# Patient Record
Sex: MALE | Race: BLACK OR AFRICAN AMERICAN | Employment: OTHER | URBAN - METROPOLITAN AREA
[De-identification: names, ages, dates, MRNs, and addresses within clinical notes are randomized per-mention and may not be internally consistent; named-entity substitution may affect disease eponyms.]

---

## 2019-04-04 ENCOUNTER — ANESTHESIA EVENT (OUTPATIENT)
Dept: OPERATING ROOM | Age: 49
End: 2019-04-04
Payer: COMMERCIAL

## 2019-04-04 ENCOUNTER — HOSPITAL ENCOUNTER (EMERGENCY)
Age: 49
Discharge: HOME | End: 2019-04-04
Attending: EMERGENCY MEDICINE
Payer: COMMERCIAL

## 2019-04-04 ENCOUNTER — HOSPITAL ENCOUNTER (OUTPATIENT)
Dept: MRI IMAGING | Age: 49
Discharge: HOME | End: 2019-04-06
Payer: COMMERCIAL

## 2019-04-04 ENCOUNTER — APPOINTMENT (OUTPATIENT)
Dept: GENERAL RADIOLOGY | Age: 49
End: 2019-04-04
Payer: COMMERCIAL

## 2019-04-04 VITALS
RESPIRATION RATE: 14 BRPM | SYSTOLIC BLOOD PRESSURE: 119 MMHG | OXYGEN SATURATION: 97 % | TEMPERATURE: 98.3 F | HEART RATE: 102 BPM | WEIGHT: 230 LBS | DIASTOLIC BLOOD PRESSURE: 78 MMHG

## 2019-04-04 DIAGNOSIS — S91.109A OPEN TOE WOUND, INITIAL ENCOUNTER: ICD-10-CM

## 2019-04-04 DIAGNOSIS — R55 SYNCOPE AND COLLAPSE: Primary | ICD-10-CM

## 2019-04-04 LAB
ABSOLUTE EOS #: 0.47 K/UL (ref 0–0.44)
ABSOLUTE IMMATURE GRANULOCYTE: 0.08 K/UL (ref 0–0.3)
ABSOLUTE LYMPH #: 4.14 K/UL (ref 1.1–3.7)
ABSOLUTE MONO #: 0.88 K/UL (ref 0.1–1.2)
ANION GAP SERPL CALCULATED.3IONS-SCNC: 13 MMOL/L (ref 9–17)
BASOPHILS # BLD: 1 % (ref 0–2)
BASOPHILS ABSOLUTE: 0.1 K/UL (ref 0–0.2)
BUN BLDV-MCNC: 14 MG/DL (ref 6–20)
BUN/CREAT BLD: ABNORMAL (ref 9–20)
CALCIUM SERPL-MCNC: 9 MG/DL (ref 8.6–10.4)
CHLORIDE BLD-SCNC: 108 MMOL/L (ref 98–107)
CO2: 21 MMOL/L (ref 20–31)
CREAT SERPL-MCNC: 0.93 MG/DL (ref 0.7–1.2)
DIFFERENTIAL TYPE: ABNORMAL
EOSINOPHILS RELATIVE PERCENT: 6 % (ref 1–4)
GFR AFRICAN AMERICAN: >60 ML/MIN
GFR NON-AFRICAN AMERICAN: >60 ML/MIN
GFR SERPL CREATININE-BSD FRML MDRD: ABNORMAL ML/MIN/{1.73_M2}
GFR SERPL CREATININE-BSD FRML MDRD: ABNORMAL ML/MIN/{1.73_M2}
GLUCOSE BLD-MCNC: 211 MG/DL (ref 70–99)
HCT VFR BLD CALC: 44 % (ref 40.7–50.3)
HEMOGLOBIN: 14.2 G/DL (ref 13–17)
IMMATURE GRANULOCYTES: 1 %
LYMPHOCYTES # BLD: 49 % (ref 24–43)
MCH RBC QN AUTO: 30.9 PG (ref 25.2–33.5)
MCHC RBC AUTO-ENTMCNC: 32.3 G/DL (ref 28.4–34.8)
MCV RBC AUTO: 95.7 FL (ref 82.6–102.9)
MONOCYTES # BLD: 10 % (ref 3–12)
NRBC AUTOMATED: 0 PER 100 WBC
PDW BLD-RTO: 13.9 % (ref 11.8–14.4)
PLATELET # BLD: 259 K/UL (ref 138–453)
PLATELET ESTIMATE: ABNORMAL
PMV BLD AUTO: 11.4 FL (ref 8.1–13.5)
POTASSIUM SERPL-SCNC: 3.5 MMOL/L (ref 3.7–5.3)
RBC # BLD: 4.6 M/UL (ref 4.21–5.77)
RBC # BLD: ABNORMAL 10*6/UL
SEG NEUTROPHILS: 33 % (ref 36–65)
SEGMENTED NEUTROPHILS ABSOLUTE COUNT: 2.81 K/UL (ref 1.5–8.1)
SODIUM BLD-SCNC: 142 MMOL/L (ref 135–144)
TROPONIN INTERP: NORMAL
TROPONIN T: NORMAL NG/ML
TROPONIN, HIGH SENSITIVITY: <6 NG/L (ref 0–22)
WBC # BLD: 8.5 K/UL (ref 3.5–11.3)
WBC # BLD: ABNORMAL 10*3/UL

## 2019-04-04 PROCEDURE — 6370000000 HC RX 637 (ALT 250 FOR IP)

## 2019-04-04 PROCEDURE — 96375 TX/PRO/DX INJ NEW DRUG ADDON: CPT

## 2019-04-04 PROCEDURE — 6360000002 HC RX W HCPCS: Performed by: STUDENT IN AN ORGANIZED HEALTH CARE EDUCATION/TRAINING PROGRAM

## 2019-04-04 PROCEDURE — 2580000003 HC RX 258: Performed by: STUDENT IN AN ORGANIZED HEALTH CARE EDUCATION/TRAINING PROGRAM

## 2019-04-04 PROCEDURE — 6360000004 HC RX CONTRAST MEDICATION: Performed by: STUDENT IN AN ORGANIZED HEALTH CARE EDUCATION/TRAINING PROGRAM

## 2019-04-04 PROCEDURE — 73720 MRI LWR EXTREMITY W/O&W/DYE: CPT

## 2019-04-04 PROCEDURE — 73630 X-RAY EXAM OF FOOT: CPT

## 2019-04-04 PROCEDURE — 6360000002 HC RX W HCPCS

## 2019-04-04 PROCEDURE — 85025 COMPLETE CBC W/AUTO DIFF WBC: CPT

## 2019-04-04 PROCEDURE — 93005 ELECTROCARDIOGRAM TRACING: CPT

## 2019-04-04 PROCEDURE — 99283 EMERGENCY DEPT VISIT LOW MDM: CPT

## 2019-04-04 PROCEDURE — 96374 THER/PROPH/DIAG INJ IV PUSH: CPT

## 2019-04-04 PROCEDURE — 84484 ASSAY OF TROPONIN QUANT: CPT

## 2019-04-04 PROCEDURE — 80048 BASIC METABOLIC PNL TOTAL CA: CPT

## 2019-04-04 PROCEDURE — A9576 INJ PROHANCE MULTIPACK: HCPCS | Performed by: STUDENT IN AN ORGANIZED HEALTH CARE EDUCATION/TRAINING PROGRAM

## 2019-04-04 RX ORDER — ONDANSETRON 2 MG/ML
INJECTION INTRAMUSCULAR; INTRAVENOUS
Status: COMPLETED
Start: 2019-04-04 | End: 2019-04-04

## 2019-04-04 RX ORDER — MORPHINE SULFATE 4 MG/ML
2 INJECTION, SOLUTION INTRAMUSCULAR; INTRAVENOUS ONCE
Status: COMPLETED | OUTPATIENT
Start: 2019-04-04 | End: 2019-04-04

## 2019-04-04 RX ORDER — MELOXICAM 15 MG/1
15 TABLET ORAL DAILY
COMMUNITY

## 2019-04-04 RX ORDER — SIMVASTATIN 20 MG
20 TABLET ORAL NIGHTLY
COMMUNITY

## 2019-04-04 RX ORDER — LOSARTAN POTASSIUM 50 MG/1
50 TABLET ORAL DAILY
COMMUNITY

## 2019-04-04 RX ORDER — 0.9 % SODIUM CHLORIDE 0.9 %
1000 INTRAVENOUS SOLUTION INTRAVENOUS ONCE
Status: COMPLETED | OUTPATIENT
Start: 2019-04-04 | End: 2019-04-04

## 2019-04-04 RX ORDER — ONDANSETRON 2 MG/ML
4 INJECTION INTRAMUSCULAR; INTRAVENOUS ONCE
Status: COMPLETED | OUTPATIENT
Start: 2019-04-04 | End: 2019-04-04

## 2019-04-04 RX ORDER — ONDANSETRON 4 MG/1
4 TABLET, ORALLY DISINTEGRATING ORAL ONCE
Qty: 2 TABLET | Refills: 0 | Status: SHIPPED | OUTPATIENT
Start: 2019-04-04 | End: 2019-04-04

## 2019-04-04 RX ORDER — METOPROLOL SUCCINATE 25 MG/1
25 TABLET, EXTENDED RELEASE ORAL DAILY
COMMUNITY

## 2019-04-04 RX ORDER — GABAPENTIN 600 MG/1
600 TABLET ORAL 4 TIMES DAILY
COMMUNITY

## 2019-04-04 RX ORDER — DULOXETIN HYDROCHLORIDE 60 MG/1
60 CAPSULE, DELAYED RELEASE ORAL DAILY
COMMUNITY

## 2019-04-04 RX ORDER — TOPIRAMATE 50 MG/1
50 TABLET, FILM COATED ORAL 2 TIMES DAILY
COMMUNITY

## 2019-04-04 RX ADMIN — MORPHINE SULFATE 2 MG: 4 INJECTION INTRAVENOUS at 09:24

## 2019-04-04 RX ADMIN — ONDANSETRON 4 MG: 2 INJECTION INTRAMUSCULAR; INTRAVENOUS at 10:47

## 2019-04-04 RX ADMIN — SODIUM CHLORIDE 1000 ML: 9 INJECTION, SOLUTION INTRAVENOUS at 08:40

## 2019-04-04 RX ADMIN — GADOTERIDOL 20 ML: 279.3 INJECTION, SOLUTION INTRAVENOUS at 15:50

## 2019-04-04 RX ADMIN — ONDANSETRON HYDROCHLORIDE 4 MG: 2 INJECTION, SOLUTION INTRAMUSCULAR; INTRAVENOUS at 10:47

## 2019-04-04 SDOH — HEALTH STABILITY: MENTAL HEALTH: HOW OFTEN DO YOU HAVE A DRINK CONTAINING ALCOHOL?: NEVER

## 2019-04-04 ASSESSMENT — ENCOUNTER SYMPTOMS
DIARRHEA: 0
ABDOMINAL PAIN: 0
COUGH: 0
RHINORRHEA: 0
VOMITING: 0
SHORTNESS OF BREATH: 0
NAUSEA: 0

## 2019-04-04 ASSESSMENT — PAIN DESCRIPTION - LOCATION: LOCATION: FOOT

## 2019-04-04 ASSESSMENT — PAIN DESCRIPTION - PAIN TYPE: TYPE: ACUTE PAIN

## 2019-04-04 ASSESSMENT — PAIN DESCRIPTION - PROGRESSION: CLINICAL_PROGRESSION: RAPIDLY IMPROVING

## 2019-04-04 ASSESSMENT — PAIN SCALES - GENERAL
PAINLEVEL_OUTOF10: 2
PAINLEVEL_OUTOF10: 10

## 2019-04-04 ASSESSMENT — PAIN DESCRIPTION - DESCRIPTORS: DESCRIPTORS: ACHING

## 2019-04-04 ASSESSMENT — PAIN DESCRIPTION - ORIENTATION: ORIENTATION: RIGHT

## 2019-04-04 NOTE — ED PROVIDER NOTES
yesterday and wouldnt stop bleeding, pt went to [facility] yesterday, no antibiotics given          weight is 230 lb (104.3 kg). His blood pressure is 97/63 and his pulse is 95. His respiration is 14 and oxygen saturation is 96%. DIAGNOSTIC RESULTS       RADIOLOGY:   XR FOOT RIGHT (MIN 3 VIEWS)   Final Result   Evaluation limited due to obscuring bandage over the great toe. Soft tissue   swelling great toe. No apparent acute osseous abnormality. RECOMMENDATION:   Radionuclide bone imaging may prove helpful for further assessment if   osteomyelitis is suspected. Films may be repeated without the bandage. LABS:  Labs Reviewed   CBC WITH AUTO DIFFERENTIAL - Abnormal; Notable for the following components:       Result Value    Seg Neutrophils 33 (*)     Lymphocytes 49 (*)     Eosinophils % 6 (*)     Immature Granulocytes 1 (*)     Absolute Lymph # 4.14 (*)     Absolute Eos # 0.47 (*)     All other components within normal limits   BASIC METABOLIC PANEL - Abnormal; Notable for the following components:    Glucose 211 (*)     Potassium 3.5 (*)     Chloride 108 (*)     All other components within normal limits   TROPONIN         EMERGENCY DEPARTMENT COURSE:     -------------------------  BP: 97/63,  , Pulse: 95, Resp: 14      Comments            Gutierrez MD, F.A.C.E.P.   Attending Emergency Physician         Jorge Luis Carpenter MD  04/04/19 1038

## 2019-04-04 NOTE — CONSULTS
Provider, MD   topiramate (TOPAMAX) 50 MG tablet Take 50 mg by mouth 2 times daily   Yes Historical Provider, MD   DULoxetine (CYMBALTA) 60 MG extended release capsule Take 60 mg by mouth daily   Yes Historical Provider, MD   gabapentin (NEURONTIN) 600 MG tablet Take 600 mg by mouth 4 times daily. Yes Historical Provider, MD    Scheduled Meds:   ondansetron  4 mg Intravenous Once    ondansetron         Continuous Infusions:  PRN Meds:. Allergies  is allergic to codeine. Family History  family history is not on file. Social History   reports that he has never smoked. He has never used smokeless tobacco.   reports that he does not drink alcohol. reports that he does not use drugs. Objective     Vitals:  Patient Vitals for the past 8 hrs:   BP Pulse Resp SpO2 Weight   19 1142 -- 102 -- 97 % --   19 1131 (!) 144/95 -- -- 96 % --   19 1117 133/78 97 -- 95 % --   19 1035 -- 95 -- 96 % --   19 1031 97/63 114 -- -- --   19 1016 (!) 130/91 102 -- -- --   19 1001 (!) 117/98 110 -- 96 % --   19 0931 (!) 109/93 103 -- 95 % --   19 0902 108/86 103 -- 96 % --   19 0846 120/82 104 -- 95 % --   19 0831 113/80 102 -- 96 % --   19 0816 122/81 103 -- 96 % --   19 0809 124/82 100 14 97 % --   19 0806 124/82 101 16 97 % 230 lb (104.3 kg)     Average, Min, and Max for last 24 hours Vitals:  TEMPERATURE:  No data recorded    RESPIRATIONS RANGE: Resp  Avg: 15  Min: 14  Max: 16    PULSE RANGE: Pulse  Av.8  Min: 95  Max: 114    BLOOD PRESSURE RANGE:  Systolic (63CCA), YTB:103 , Min:97 , FLS:161   ; Diastolic (03JMK), HKH:29, Min:63, Max:98      PULSE OXIMETRY RANGE: SpO2  Av %  Min: 95 %  Max: 97 %  I&O:  No intake/output data recorded.     CBC:  Recent Labs     19  0832   WBC 8.5   HGB 14.2   HCT 44.0           BMP:  Recent Labs     19  0832      K 3.5*   *   CO2 21   BUN 14   CREATININE 0.93 GLUCOSE 211*   CALCIUM 9.0        Coags:  No results for input(s): APTT, PROT, INR in the last 72 hours. No results found for: LABA1C  No results found for: SEDRATE  No results found for: CRP      Lower Extremity Physical Exam:  Vascular: DP and PT pulses are palpable. CFT <3 seconds to all digits. Hair growth is present to the level of the digits. No edema. Neuro: Saph/sural/SP/DP/plantar sensation intact to light touch. Musculoskeletal: Muscle strength is 4/5 to all lower extremity muscle groups. Gross deformity is absent. Dermatologic: Oozing hemorrhagic soft tissue lesion present to the medial right hallux measuring approximately 1.5cm x 1.5cm. Base is hypergranular. No periwound erythema. Does not probe to bone, sinus track, or undermine. No fluctuance, crepitus, or induration. Interdigital maceration absent. Imaging:   XR FOOT RIGHT (MIN 3 VIEWS)   Final Result   Evaluation limited due to obscuring bandage over the great toe. Soft tissue   swelling great toe. No apparent acute osseous abnormality. RECOMMENDATION:   Radionuclide bone imaging may prove helpful for further assessment if   osteomyelitis is suspected. Films may be repeated without the bandage. Path: ST mass    Assessment     Liseth Livingston is a 46 y.o. male with   1. Benign soft tissue lesion  2. DM type II    Active Problems:    * No active hospital problems. *  Resolved Problems:    * No resolved hospital problems. *        Plan     · Patient examined and evaluated at bedside   · Treatment options discussed in detail with the patient  · Right hallux block performed using 5cc of 1:1 1% lidocaine plain and 1% lidocaine with epi.  Patient tolerated the injection well  · X-rays reviewed  · MRI ordered  · Wound cauterized ising silver nitrate and electric bovie      · Tissue sent to pathology  · Dressing applied to Right foot: adaptic, 4x4, kerlix, coban  · Instructed patient to minimize ambulation and elevate foot above the level of his heart when he gets home  · Dispense surgical shoe  · Patient scheduled for OR at [address] tomorrow morning for removal of benign soft tissue neoplasm  ·  Minimal WB to Right lower extremity in surgical shoe  · Discussed with Dr. Agustina Love, DPM   Podiatric Medicine & Surgery   4/4/2019 at 12:08 PM

## 2019-04-04 NOTE — ED NOTES
Podiatry at bedside to assess patients foot and place new bandage.      Venus Escalante RN  04/04/19 6309

## 2019-04-04 NOTE — ED PROVIDER NOTES
[facility] ED  Emergency Department Encounter  Emergency Medicine Resident     Pt Name: Андрей Gonzales  MRN: [id number]  [place] 1970  Date ofevaluation: 4/4/19  PCP:  Piter Bolden MD    CHIEF COMPLAINT       Chief Complaint   Patient presents with    Loss of Consciousness     to er via LS 11 pts wife states pt was in bathtub around 7 am and she was helping him wash his foot when he stated \"i feel like i'm going to pass out\" and wife states pt had syncopal episode while sitting in tub lasting approx 3 mins, did not hit head    Abscess     pt has had abscess to right foot x a few years and it popped on its own yesterday and wouldnt stop bleeding, [facility] given     HISTORY OF PRESENT ILLNESS  (Location/Symptom, Timing/Onset, Context/Setting, Quality, Duration, Modifying Factors, Severity.)      Patient examined by myself and medical student. What follows is the medical student's HPI with my pertinent additions and subtractions. Андрей Gonzales is a 46 y.o. male with PMHx of HTN, HLD, and DM presenting by EMS after syncopal episode onset PTA as well as bleeding lesion to right great toe. Pt was seen yesterday at [facility] for bleeding of his R great toe, states he was walking initially in a grocery store and looked down and noticed that his foot was bleeding through his shoe. Denies any trauma to the area, however reports a hx of neuropathy so he is unsure. The lesion was cauterized and a thrombin dressing was placed and patient was discharged home. Wife believes the dressing may have come off overnight, states that this morning she woke up with blood all over their bed. She reports that she helped him into the bathtub to clean him up and after she was done bandaging the wound the pt became diaphoretic, lightheaded, and weak and subsequently had a syncopal episode lasting approximately 3 minutes.  Pt's wife reports that the pt had circumoral pallor but denied any seizure like activity and no postictal state immediately after the episode. EMS reports approx 1 liter of blood noted in the home upon their arrival. Denied history of heart disease, CP, SOB, palpitations, dark stool, or recent medication changes  Denied tobacco, alcohol, or drug use. Not on anticoagulants. Patient reports he has had this lesion to his right great toe for approx 2 years, reports intermittent bleeding from the lesion but never to this extent. He has never seen anyone for this and has never shown this lesion to his primary care provider. REVIEW OF SYSTEMS    (2-9 systems for level 4, 10 or more for level 5)      Review of Systems   Constitutional: Positive for diaphoresis. Negative for chills and fever. HENT: Negative for congestion and rhinorrhea. Eyes: Negative for visual disturbance. Respiratory: Negative for cough and shortness of breath. Cardiovascular: Negative for chest pain, palpitations and leg swelling. Gastrointestinal: Negative for abdominal pain, diarrhea, nausea and vomiting. Genitourinary: Negative for dysuria and frequency. Musculoskeletal: Negative for gait problem and neck pain. Bleeding lesion to R great toe   Skin: Negative for rash. Neurological: Positive for syncope and light-headedness. Negative for dizziness, seizures, weakness and headaches. Psychiatric/Behavioral: Negative for confusion. PAST MEDICAL / SURGICAL /SOCIAL / FAMILY HISTORY      has a past medical history of Bleeding, Diabetes mellitus ([facility] [address] DVT (deep vein thrombosis) in pregnancy ([facility] [address] Hypertension, Neuropathy, and Pulmonary emboli ([facility] [address] has a past surgical history that includes shoulder surgery; Foot surgery (Right, 04/05/2019); and Foot neuroma surgery (Right, 4/5/2019).     Social History     Socioeconomic History    Marital status: Single     Spouse name: Not on file    Number of children: Not on file    Years of education: Not on file    Highest education level: Not on file   Occupational History    Not on file   Social Needs    Financial resource strain: Not on file    Food insecurity:     Worry: Not on file     Inability: Not on file    Transportation needs:     Medical: Not on file     Non-medical: Not on file   Tobacco Use    Smoking status: Never Smoker    Smokeless tobacco: Never Used   Substance and Sexual Activity    Alcohol use: Never     Frequency: Never    Drug use: Never    Sexual activity: Not on file   Lifestyle    Physical activity:     Days per week: Not on file     Minutes per session: Not on file    Stress: Not on file   Relationships    Social connections:     Talks on phone: Not on file     Gets together: Not on file     Attends [employer]ism service: Not on file     Active member of club or organization: Not on file     Attends meetings of clubs or organizations: Not on file     Relationship status: Not on file    Intimate partner violence:     Fear of current or ex partner: Not on file     Emotionally abused: Not on file     Physically abused: Not on file     Forced sexual activity: Not on file   Other Topics Concern    Not on file   Social History Narrative    Not on file      History reviewed. No pertinent family history. Portions of the past medical history, past surgical history, social history, and family history were discussed and reviewed with thepatient/family and is included in HPI if pertinent. ALLERGIES / IMMUNIZATIONS / HOME MEDICATIONS     Allergies:  Codeine    IMMUNIZATIONS      There is no immunization history on file for this patient. Home Medications:  Prior to Admission medications    Medication Sig Start Date End Date Taking?  Authorizing Provider   losartan (COZAAR) 50 MG tablet Take 50 mg by mouth daily   Yes Historical Provider, MD   meloxicam (MOBIC) 15 MG tablet Take 15 mg by mouth daily   Yes Historical Provider, MD   metoprolol succinate (TOPROL XL) 25 MG extended release tablet Take 25 mg by mouth daily   Yes Historical Provider, MD   simvastatin (ZOCOR) 20 MG tablet Take 20 mg by mouth nightly   Yes Historical Provider, MD   topiramate (TOPAMAX) 50 MG tablet Take 50 mg by mouth 2 times daily   Yes Historical Provider, MD   DULoxetine (CYMBALTA) 60 MG extended release capsule Take 60 mg by mouth daily   Yes Historical Provider, MD   gabapentin (NEURONTIN) 600 MG tablet Take 600 mg by mouth 4 times daily. Yes Historical Provider, MD   aspirin 81 MG tablet Take 81 mg by mouth every other day    Historical Provider, MD   traMADol (ULTRAM) 50 MG tablet Take 1 tablet by mouth every 6 hours as needed for Pain for up to 7 days. 4/5/19 4/12/19  Imelda Boards, DPM       PHYSICAL EXAM   (up to 7 for level 4, 8 or more for level 5)      INITIAL VITALS:    weight is 230 lb (104.3 kg). His oral temperature is 98.3 °F (36.8 °C). His blood pressure is 119/78 and his pulse is 102. His respiration is 14 and oxygen saturation is 97%. Physical Exam   Constitutional: He is oriented to person, place, and time. He appears well-developed and well-nourished. No distress   HENT:   Head: Normocephalic and atraumatic. Mildly dry mucous membranes   Eyes: Pupils are equal, round, and reactive to light. EOM are normal.   Neck: Normal range of motion. Neck supple. Cardiovascular: Regular rhythm, normal heart sounds and intact distal pulses. Exam reveals no gallop and no friction rub. No murmur heard. Mildly tachycardic   Pulmonary/Chest: Effort normal and breath sounds normal.   Abdominal: Soft. Bowel sounds are normal. He exhibits no distension. There is no tenderness. There is no rebound and no guarding. Musculoskeletal: Normal range of motion. He exhibits no edema. 2cm soft tissue lesion to the medial aspect of the right great toe with active bleeding, no surrounding erythema, induration, or fluctuance noted   Neurological: He is alert and oriented to person, place, and time. Skin: Skin is warm and dry.  No HISTORY: ORDERING SYSTEM PROVIDED HISTORY: chronic wound with recurrent bleeding TECHNOLOGIST PROVIDED HISTORY: chronic wound with recurrent bleeding FINDINGS: Overlying bandage is present along the great toe obscuring some of the bony detail. Diffuse soft tissue swelling of the great toe is noted without acute fracture or dislocation. No definite bony destruction is noted but evaluation is limited due to overlying bandage. The LisFranc relationship is preserved. Evaluation limited due to obscuring bandage over the great toe. Soft tissue swelling great toe. No apparent acute osseous abnormality. RECOMMENDATION: Radionuclide bone imaging may prove helpful for further assessment if osteomyelitis is suspected. Films may be repeated without the bandage. EKG  EKG Interpretation    Interpreted by emergency department physician    Rhythm: sinus tachycardia  Rate: 102  Axis: normal  Ectopy: none  Conduction: normal  ST Segments: no acute change  T Waves: non specific changes  Q Waves: nonspecific    Clinical Impression: no acute changes    Dinora Anderson    All EKG's are interpreted by the Emergency Department Physician who either signs or Co-signs this chart in the absence of a cardiologist.    ED BEDSIDE ULTRASOUND:   Not indicated    [address] [place] [address]   45 y/o M here for evaluation after a syncopal episode PTA. Also with significant bleeding from a lesion to his R great toe that he has had for 2 years and has never had evaluated. No significant symptoms on evaluation, has a 2cm soft tissue lesion to R great toe that is actively bleeding. Will obtain syncope work up, xray of R foot, will apply clotting dressing to foot to attempt to stop bleeding. Foot still bleeding, xray unremarkable. Will consult podiatry. Work up unremarkable, hgb stable. Podiatry resident has seen patient. To try and cauterize lesion. Patient still with some bleeding, podiatry resident will cauterize again. Lesion no longer bleeding. Podiatry to [facility]. Ok to discharge from their standpoint. To be placed in surgical boot. Discussed return precautions with patient. He is agreeable with plan. Podiatry recommending MRI prior to procedure tomorrow. Will provide Rx for patient to have this done today outpatient as it is not emergent and does not be to be done from the ED. Patient agreeable. Patient discharged in stable condition. PROCEDURES:  Procedures    CONSULTS:  IP CONSULT TO PODIATRY    CRITICAL CARE:  See attending note    FINAL IMPRESSION      1. Syncope and collapse    2. Open toe wound, initial encounter         DISPOSITION / PLAN     DISPOSITION Decision To Discharge 04/04/2019 12:38:52 PM      If the patient was admitted, some of the above orders,medications, labs, and consults may have been placed by the admitting team(s) and were auto populated above when I refreshed my note prior to signing it. If there is any question, please check for the responsible providerfor individual orders in the EHR system. If discharged, the patient was instructed to return to the emergency department with any worsening symptoms, if new symptoms arise, or if they have any other concerns. Patient was instructed not to drive home if discharged today and received pain medications or other mind-altering medications while here. Pre-hypertension/Hypertension: In the case that there was an elevated blood pressure reading for this patient today in the emergency department, the patient was informed thathe or she may have pre-hypertension or hypertension. It was recommended to the patient to call the primary care provider listed in the discharge instructions or a physician of the patient's choice this week to arrange followup for further evaluation of possible pre-hypertension or hypertension.        PATIENT REFERRED TO:  Go to your appointment tomorrow as scheduled  anticipate a phone call with information regarding time and location        [facility] ED  [address] [facility] [address] [facility]  [phone]    As needed, If symptoms worsen    Viviana Crouch MD  [facility]  [phone]    Schedule an appointment as soon as possible for a visit       DISCHARGE MEDICATIONS:  Discharge Medication List as of 4/4/2019 12:49 PM          Chang Santoro DO  Emergency Medicine Resident    (Please note that portions of this note were completed with a voice recognition program.  Martinez Miranda made to edit the dictations but occasionally words are mis-transcribed.)      Chang Santoro DO  04/05/19 6057

## 2019-04-04 NOTE — ED NOTES
Pt states that he feels like he is going to \"pass out. \" 2nd liter of normal saline fluid started IV. Dr. Latonia Rodríguez at bedside to evaluate patient. Pt laying flat at this time. Pt alert and oriented. Still with c/o pain.       Maximo Malone RN  04/04/19 1038

## 2019-04-04 NOTE — ED NOTES
pts foot actively bleeding. Dr. Aixa De Leon at bedside to place thrombi pad and dressing.       Kathleen Darden RN  04/04/19 3431

## 2019-04-04 NOTE — ED NOTES
Pt resting on cart, respirations are equal and nonlabored, no distress noted. Pt updated on poc and duration, continue to monitor.       Aung Galaviz RN  04/04/19 0891

## 2019-04-04 NOTE — ED NOTES
See triage note. Per EMS 1st responders pt had 1 liter of blood throughout house and had positive orthostatics. Pts wife states that they attempted to cauterize wound at [facility] and were unable to do so and ended up using a thrombi pad. Pt denies taking any blood thinners. Pt placed on cardiac monitor, pulse ox and BP cuff. Alarms on.       Dilia Mcintyre RN  04/04/19 7951

## 2019-04-04 NOTE — PROGRESS NOTES
45 yo m with PMHx of HTN, HLD, and pre-DM presenting for possible syncopal episode. Pt was seen yesterday at [facility] for bleeding of his R toe in which they cauterized the bleeding and applied a bandage. Pt's wife reports that this morning she woke up with blood all over their bed as patient kicked his bandage off. She reports that she helped him into the bathtub to clean him up and after she was done bandaging the wound the pt passed out for 4-5 minutes. Pt reports that prior to passing out he felt warm, sweaty, and lightheaded. Pt's wife reports that the pt had circumoral pallor but denied any seizure like activity and no postictal state immediately after the episode. Denied history of heart disease, CP, SOB, palpitations, dark stool, or recent medication changes  Denied tobacco, alcohol, or drug use. Past Medical History:   Diagnosis Date    Diabetes mellitus ([facility] [address]     DVT (deep vein thrombosis) in pregnancy ([facility] [address]     Hypertension     Neuropathy     Pulmonary emboli (H[facility])      Past Surgical History:   Procedure Laterality Date    SHOULDER SURGERY       No current facility-administered medications on file prior to encounter. Current Outpatient Medications on File Prior to Encounter   Medication Sig Dispense Refill    losartan (COZAAR) 50 MG tablet Take 50 mg by mouth daily      meloxicam (MOBIC) 15 MG tablet Take 15 mg by mouth daily      metoprolol succinate (TOPROL XL) 25 MG extended release tablet Take 25 mg by mouth daily      simvastatin (ZOCOR) 20 MG tablet Take 20 mg by mouth nightly      topiramate (TOPAMAX) 50 MG tablet Take 50 mg by mouth 2 times daily      DULoxetine (CYMBALTA) 60 MG extended release capsule Take 60 mg by mouth daily      gabapentin (NEURONTIN) 600 MG tablet Take 600 mg by mouth 4 times daily.

## 2019-04-05 ENCOUNTER — ANESTHESIA (OUTPATIENT)
Dept: OPERATING ROOM | Age: 49
End: 2019-04-05
Payer: COMMERCIAL

## 2019-04-05 ENCOUNTER — HOSPITAL ENCOUNTER (OUTPATIENT)
Age: 49
Setting detail: OUTPATIENT SURGERY
Discharge: HOME | End: 2019-04-05
Attending: PODIATRIST | Admitting: PODIATRIST
Payer: COMMERCIAL

## 2019-04-05 VITALS
HEART RATE: 94 BPM | DIASTOLIC BLOOD PRESSURE: 73 MMHG | HEIGHT: 71 IN | SYSTOLIC BLOOD PRESSURE: 107 MMHG | OXYGEN SATURATION: 94 % | WEIGHT: 238.54 LBS | TEMPERATURE: 97.9 F | BODY MASS INDEX: 33.4 KG/M2 | RESPIRATION RATE: 14 BRPM

## 2019-04-05 VITALS
SYSTOLIC BLOOD PRESSURE: 107 MMHG | DIASTOLIC BLOOD PRESSURE: 59 MMHG | OXYGEN SATURATION: 98 % | RESPIRATION RATE: 27 BRPM

## 2019-04-05 DIAGNOSIS — G89.18 POSTOPERATIVE PAIN: Primary | ICD-10-CM

## 2019-04-05 LAB
EKG ATRIAL RATE: 102 BPM
EKG P AXIS: 33 DEGREES
EKG P-R INTERVAL: 156 MS
EKG Q-T INTERVAL: 338 MS
EKG QRS DURATION: 82 MS
EKG QTC CALCULATION (BAZETT): 440 MS
EKG R AXIS: 14 DEGREES
EKG T AXIS: 68 DEGREES
EKG VENTRICULAR RATE: 102 BPM
GLUCOSE BLD-MCNC: 124 MG/DL (ref 75–110)
GLUCOSE BLD-MCNC: 125 MG/DL (ref 75–110)

## 2019-04-05 PROCEDURE — 2580000003 HC RX 258: Performed by: NURSE ANESTHETIST, CERTIFIED REGISTERED

## 2019-04-05 PROCEDURE — 2709999900 HC NON-CHARGEABLE SUPPLY: Performed by: PODIATRIST

## 2019-04-05 PROCEDURE — 3700000000 HC ANESTHESIA ATTENDED CARE: Performed by: PODIATRIST

## 2019-04-05 PROCEDURE — 2580000003 HC RX 258: Performed by: ANESTHESIOLOGY

## 2019-04-05 PROCEDURE — 2500000003 HC RX 250 WO HCPCS: Performed by: NURSE ANESTHETIST, CERTIFIED REGISTERED

## 2019-04-05 PROCEDURE — 7100000000 HC PACU RECOVERY - FIRST 15 MIN: Performed by: PODIATRIST

## 2019-04-05 PROCEDURE — 7100000001 HC PACU RECOVERY - ADDTL 15 MIN: Performed by: PODIATRIST

## 2019-04-05 PROCEDURE — 88342 IMHCHEM/IMCYTCHM 1ST ANTB: CPT

## 2019-04-05 PROCEDURE — 2500000003 HC RX 250 WO HCPCS: Performed by: PODIATRIST

## 2019-04-05 PROCEDURE — 3600000002 HC SURGERY LEVEL 2 BASE: Performed by: PODIATRIST

## 2019-04-05 PROCEDURE — 88341 IMHCHEM/IMCYTCHM EA ADD ANTB: CPT

## 2019-04-05 PROCEDURE — 6360000002 HC RX W HCPCS: Performed by: NURSE ANESTHETIST, CERTIFIED REGISTERED

## 2019-04-05 PROCEDURE — 7100000011 HC PHASE II RECOVERY - ADDTL 15 MIN: Performed by: PODIATRIST

## 2019-04-05 PROCEDURE — 11421 EXC H-F-NK-SP B9+MARG 0.6-1: CPT | Performed by: PODIATRIST

## 2019-04-05 PROCEDURE — 3600000012 HC SURGERY LEVEL 2 ADDTL 15MIN: Performed by: PODIATRIST

## 2019-04-05 PROCEDURE — 82947 ASSAY GLUCOSE BLOOD QUANT: CPT

## 2019-04-05 PROCEDURE — 88307 TISSUE EXAM BY PATHOLOGIST: CPT

## 2019-04-05 PROCEDURE — 7100000010 HC PHASE II RECOVERY - FIRST 15 MIN: Performed by: PODIATRIST

## 2019-04-05 PROCEDURE — 3700000001 HC ADD 15 MINUTES (ANESTHESIA): Performed by: PODIATRIST

## 2019-04-05 RX ORDER — LABETALOL HYDROCHLORIDE 5 MG/ML
5 INJECTION, SOLUTION INTRAVENOUS EVERY 10 MIN PRN
Status: DISCONTINUED | OUTPATIENT
Start: 2019-04-05 | End: 2019-04-05 | Stop reason: HOSPADM

## 2019-04-05 RX ORDER — MEPERIDINE HYDROCHLORIDE 50 MG/ML
12.5 INJECTION INTRAMUSCULAR; INTRAVENOUS; SUBCUTANEOUS EVERY 5 MIN PRN
Status: DISCONTINUED | OUTPATIENT
Start: 2019-04-05 | End: 2019-04-05 | Stop reason: HOSPADM

## 2019-04-05 RX ORDER — PHENYLEPHRINE HCL IN 0.9% NACL 1 MG/10 ML
SYRINGE (ML) INTRAVENOUS PRN
Status: DISCONTINUED | OUTPATIENT
Start: 2019-04-05 | End: 2019-04-05 | Stop reason: SDUPTHER

## 2019-04-05 RX ORDER — ONDANSETRON 2 MG/ML
4 INJECTION INTRAMUSCULAR; INTRAVENOUS
Status: DISCONTINUED | OUTPATIENT
Start: 2019-04-05 | End: 2019-04-05 | Stop reason: HOSPADM

## 2019-04-05 RX ORDER — FENTANYL CITRATE 50 UG/ML
25 INJECTION, SOLUTION INTRAMUSCULAR; INTRAVENOUS EVERY 5 MIN PRN
Status: DISCONTINUED | OUTPATIENT
Start: 2019-04-05 | End: 2019-04-05 | Stop reason: HOSPADM

## 2019-04-05 RX ORDER — SODIUM CHLORIDE 0.9 % (FLUSH) 0.9 %
10 SYRINGE (ML) INJECTION PRN
Status: DISCONTINUED | OUTPATIENT
Start: 2019-04-05 | End: 2019-04-05 | Stop reason: HOSPADM

## 2019-04-05 RX ORDER — SODIUM CHLORIDE, SODIUM LACTATE, POTASSIUM CHLORIDE, CALCIUM CHLORIDE 600; 310; 30; 20 MG/100ML; MG/100ML; MG/100ML; MG/100ML
INJECTION, SOLUTION INTRAVENOUS CONTINUOUS PRN
Status: DISCONTINUED | OUTPATIENT
Start: 2019-04-05 | End: 2019-04-05 | Stop reason: SDUPTHER

## 2019-04-05 RX ORDER — FENTANYL CITRATE 50 UG/ML
INJECTION, SOLUTION INTRAMUSCULAR; INTRAVENOUS PRN
Status: DISCONTINUED | OUTPATIENT
Start: 2019-04-05 | End: 2019-04-05 | Stop reason: SDUPTHER

## 2019-04-05 RX ORDER — SODIUM CHLORIDE 0.9 % (FLUSH) 0.9 %
10 SYRINGE (ML) INJECTION EVERY 12 HOURS SCHEDULED
Status: DISCONTINUED | OUTPATIENT
Start: 2019-04-05 | End: 2019-04-05 | Stop reason: HOSPADM

## 2019-04-05 RX ORDER — HYDROMORPHONE HCL 110MG/55ML
0.5 PATIENT CONTROLLED ANALGESIA SYRINGE INTRAVENOUS EVERY 5 MIN PRN
Status: DISCONTINUED | OUTPATIENT
Start: 2019-04-05 | End: 2019-04-05 | Stop reason: HOSPADM

## 2019-04-05 RX ORDER — OXYCODONE HYDROCHLORIDE AND ACETAMINOPHEN 5; 325 MG/1; MG/1
2 TABLET ORAL PRN
Status: DISCONTINUED | OUTPATIENT
Start: 2019-04-05 | End: 2019-04-05 | Stop reason: HOSPADM

## 2019-04-05 RX ORDER — LIDOCAINE HYDROCHLORIDE 10 MG/ML
INJECTION, SOLUTION EPIDURAL; INFILTRATION; INTRACAUDAL; PERINEURAL PRN
Status: DISCONTINUED | OUTPATIENT
Start: 2019-04-05 | End: 2019-04-05 | Stop reason: ALTCHOICE

## 2019-04-05 RX ORDER — DIPHENHYDRAMINE HYDROCHLORIDE 50 MG/ML
12.5 INJECTION INTRAMUSCULAR; INTRAVENOUS
Status: DISCONTINUED | OUTPATIENT
Start: 2019-04-05 | End: 2019-04-05 | Stop reason: HOSPADM

## 2019-04-05 RX ORDER — HYDRALAZINE HYDROCHLORIDE 20 MG/ML
5 INJECTION INTRAMUSCULAR; INTRAVENOUS EVERY 10 MIN PRN
Status: DISCONTINUED | OUTPATIENT
Start: 2019-04-05 | End: 2019-04-05 | Stop reason: HOSPADM

## 2019-04-05 RX ORDER — DEXAMETHASONE SODIUM PHOSPHATE 10 MG/ML
INJECTION INTRAMUSCULAR; INTRAVENOUS PRN
Status: DISCONTINUED | OUTPATIENT
Start: 2019-04-05 | End: 2019-04-05 | Stop reason: SDUPTHER

## 2019-04-05 RX ORDER — SODIUM CHLORIDE, SODIUM LACTATE, POTASSIUM CHLORIDE, CALCIUM CHLORIDE 600; 310; 30; 20 MG/100ML; MG/100ML; MG/100ML; MG/100ML
INJECTION, SOLUTION INTRAVENOUS CONTINUOUS
Status: DISCONTINUED | OUTPATIENT
Start: 2019-04-06 | End: 2019-04-05 | Stop reason: HOSPADM

## 2019-04-05 RX ORDER — PROPOFOL 10 MG/ML
INJECTION, EMULSION INTRAVENOUS PRN
Status: DISCONTINUED | OUTPATIENT
Start: 2019-04-05 | End: 2019-04-05 | Stop reason: SDUPTHER

## 2019-04-05 RX ORDER — LIDOCAINE HYDROCHLORIDE 10 MG/ML
1 INJECTION, SOLUTION EPIDURAL; INFILTRATION; INTRACAUDAL; PERINEURAL
Status: DISCONTINUED | OUTPATIENT
Start: 2019-04-06 | End: 2019-04-05 | Stop reason: HOSPADM

## 2019-04-05 RX ORDER — MIDAZOLAM HYDROCHLORIDE 1 MG/ML
INJECTION INTRAMUSCULAR; INTRAVENOUS PRN
Status: DISCONTINUED | OUTPATIENT
Start: 2019-04-05 | End: 2019-04-05 | Stop reason: SDUPTHER

## 2019-04-05 RX ORDER — LIDOCAINE HYDROCHLORIDE 20 MG/ML
INJECTION, SOLUTION EPIDURAL; INFILTRATION; INTRACAUDAL; PERINEURAL PRN
Status: DISCONTINUED | OUTPATIENT
Start: 2019-04-05 | End: 2019-04-05 | Stop reason: SDUPTHER

## 2019-04-05 RX ORDER — SODIUM CHLORIDE 9 MG/ML
INJECTION, SOLUTION INTRAVENOUS CONTINUOUS
Status: DISCONTINUED | OUTPATIENT
Start: 2019-04-06 | End: 2019-04-05 | Stop reason: HOSPADM

## 2019-04-05 RX ORDER — OXYCODONE HYDROCHLORIDE AND ACETAMINOPHEN 5; 325 MG/1; MG/1
1 TABLET ORAL PRN
Status: DISCONTINUED | OUTPATIENT
Start: 2019-04-05 | End: 2019-04-05 | Stop reason: HOSPADM

## 2019-04-05 RX ORDER — DEXAMETHASONE SODIUM PHOSPHATE 10 MG/ML
4 INJECTION, SOLUTION INTRAMUSCULAR; INTRAVENOUS
Status: DISCONTINUED | OUTPATIENT
Start: 2019-04-05 | End: 2019-04-05 | Stop reason: HOSPADM

## 2019-04-05 RX ORDER — BUPIVACAINE HYDROCHLORIDE 5 MG/ML
INJECTION, SOLUTION PERINEURAL PRN
Status: DISCONTINUED | OUTPATIENT
Start: 2019-04-05 | End: 2019-04-05 | Stop reason: ALTCHOICE

## 2019-04-05 RX ORDER — TRAMADOL HYDROCHLORIDE 50 MG/1
50 TABLET ORAL EVERY 6 HOURS PRN
Qty: 28 TABLET | Refills: 0 | Status: SHIPPED | OUTPATIENT
Start: 2019-04-05 | End: 2019-04-12

## 2019-04-05 RX ADMIN — Medication 100 MCG: at 10:45

## 2019-04-05 RX ADMIN — Medication 100 MCG: at 10:52

## 2019-04-05 RX ADMIN — LIDOCAINE HYDROCHLORIDE 50 MG: 20 INJECTION, SOLUTION EPIDURAL; INFILTRATION; INTRACAUDAL; PERINEURAL at 10:25

## 2019-04-05 RX ADMIN — PROPOFOL 100 MG: 10 INJECTION, EMULSION INTRAVENOUS at 10:25

## 2019-04-05 RX ADMIN — Medication 200 MCG: at 10:43

## 2019-04-05 RX ADMIN — Medication 50 MCG: at 10:20

## 2019-04-05 RX ADMIN — SODIUM CHLORIDE, POTASSIUM CHLORIDE, SODIUM LACTATE AND CALCIUM CHLORIDE: 600; 310; 30; 20 INJECTION, SOLUTION INTRAVENOUS at 10:15

## 2019-04-05 RX ADMIN — DEXAMETHASONE SODIUM PHOSPHATE 10 MG: 10 INJECTION INTRAMUSCULAR; INTRAVENOUS at 10:40

## 2019-04-05 RX ADMIN — Medication 50 MCG: at 10:25

## 2019-04-05 RX ADMIN — MIDAZOLAM 2 MG: 1 INJECTION INTRAMUSCULAR; INTRAVENOUS at 10:15

## 2019-04-05 RX ADMIN — PROPOFOL 50 MG: 10 INJECTION, EMULSION INTRAVENOUS at 10:20

## 2019-04-05 RX ADMIN — SODIUM CHLORIDE, POTASSIUM CHLORIDE, SODIUM LACTATE AND CALCIUM CHLORIDE: 600; 310; 30; 20 INJECTION, SOLUTION INTRAVENOUS at 09:04

## 2019-04-05 RX ADMIN — PROPOFOL 50 MG: 10 INJECTION, EMULSION INTRAVENOUS at 10:22

## 2019-04-05 ASSESSMENT — PULMONARY FUNCTION TESTS
PIF_VALUE: 13
PIF_VALUE: 1
PIF_VALUE: 14
PIF_VALUE: 0
PIF_VALUE: 24
PIF_VALUE: 14
PIF_VALUE: 14
PIF_VALUE: 1
PIF_VALUE: 14
PIF_VALUE: 13
PIF_VALUE: 14
PIF_VALUE: 1
PIF_VALUE: 14
PIF_VALUE: 13
PIF_VALUE: 14
PIF_VALUE: 13
PIF_VALUE: 4
PIF_VALUE: 1
PIF_VALUE: 25
PIF_VALUE: 1
PIF_VALUE: 0
PIF_VALUE: 1
PIF_VALUE: 14
PIF_VALUE: 14
PIF_VALUE: 11
PIF_VALUE: 12
PIF_VALUE: 2
PIF_VALUE: 4
PIF_VALUE: 14
PIF_VALUE: 13
PIF_VALUE: 14
PIF_VALUE: 0
PIF_VALUE: 14
PIF_VALUE: 14
PIF_VALUE: 1
PIF_VALUE: 14
PIF_VALUE: 14
PIF_VALUE: 0

## 2019-04-05 ASSESSMENT — PAIN SCALES - GENERAL
PAINLEVEL_OUTOF10: 0

## 2019-04-05 ASSESSMENT — PAIN - FUNCTIONAL ASSESSMENT: PAIN_FUNCTIONAL_ASSESSMENT: 0-10

## 2019-04-05 NOTE — ANESTHESIA PRE PROCEDURE
Department of Anesthesiology  Preprocedure Note       Name:  Mariza Temple   Age:  46 y.o.  :  1970                                          MRN:  [id number]         Date:  2019      Surgeon: Etta Mabry):  Agueda Santos DPM    Procedure: REMOVAL BENIGN NEOPLASM RIGHT HALLUX (Right )    Medications prior to admission:   Prior to Admission medications    Medication Sig Start Date End Date Taking? Authorizing Provider   aspirin 81 MG tablet Take 81 mg by mouth every other day   Yes Historical Provider, MD   losartan (COZAAR) 50 MG tablet Take 50 mg by mouth daily   Yes Historical Provider, MD   meloxicam (MOBIC) 15 MG tablet Take 15 mg by mouth daily    Historical Provider, MD   metoprolol succinate (TOPROL XL) 25 MG extended release tablet Take 25 mg by mouth daily    Historical Provider, MD   simvastatin (ZOCOR) 20 MG tablet Take 20 mg by mouth nightly    Historical Provider, MD   topiramate (TOPAMAX) 50 MG tablet Take 50 mg by mouth 2 times daily    Historical Provider, MD   DULoxetine (CYMBALTA) 60 MG extended release capsule Take 60 mg by mouth daily    Historical Provider, MD   gabapentin (NEURONTIN) 600 MG tablet Take 600 mg by mouth 4 times daily. Historical Provider, MD       Current medications:    Current Facility-Administered Medications   Medication Dose Route Frequency Provider Last Rate Last Dose    [START ON 2019] 0.9 % sodium chloride infusion   Intravenous Continuous Carlos Le DO        [START ON 2019] lactated ringers infusion   Intravenous Continuous Carlos Le, DO        sodium chloride flush 0.9 % injection 10 mL  10 mL Intravenous 2 times per day Carlos Le DO        sodium chloride flush 0.9 % injection 10 mL  10 mL Intravenous PRN Satinder Blackman, DO        [START ON 2019] lidocaine PF 1 % injection 1 mL  1 mL Intradermal Once PRN Robet Yehuda, DO           Allergies:     Allergies   Allergen Reactions    Codeine        Problem List: There is no problem list on file for this patient. Past Medical History:        Diagnosis Date    [facility]     DVT (deep vein thrombosis) in pregnancy ([facility]     Hypertension     Neuropathy     Pulmonary emboli (HCC)        Past Surgical History:        Procedure Laterality Date    SHOULDER SURGERY         Social History:    Social History     Tobacco Use    Smoking status: Never Smoker    Smokeless tobacco: Never Used   Substance Use Topics    Alcohol use: Never     Frequency: Never                                Counseling given: Not Answered      Vital Signs (Current):   Vitals:    04/05/19 0833   BP: (!) 147/83   Pulse: 106   Resp: 18   Temp: 97.9 °F (36.6 °C)   TempSrc: Oral   SpO2: 96%                                              BP Readings from Last 3 Encounters:   04/05/19 (!) 147/83   04/04/19 119/78       NPO Status: Time of last liquid consumption: 2330                        Time of last solid consumption: 2100                        Date of last liquid consumption: 04/04/19                        Date of last solid food consumption: 04/04/19    BMI:   Wt Readings from Last 3 Encounters:   04/04/19 230 lb (104.3 kg)     There is no height or weight on file to calculate BMI.    CBC:   Lab Results   Component Value Date    WBC 8.5 04/04/2019    RBC 4.60 04/04/2019    HGB 14.2 04/04/2019    HCT 44.0 04/04/2019    MCV 95.7 04/04/2019    RDW 13.9 04/04/2019     04/04/2019       CMP:   Lab Results   Component Value Date     04/04/2019    K 3.5 04/04/2019     04/04/2019    CO2 21 04/04/2019    BUN 14 04/04/2019    CREATININE 0.93 04/04/2019    GFRAA >60 04/04/2019    LABGLOM >60 04/04/2019    GLUCOSE 211 04/04/2019    CALCIUM 9.0 04/04/2019    BILITOT 0.50 03/10/2013    ALKPHOS 85 03/10/2013    AST 23 03/10/2013    ALT 35 03/10/2013       POC Tests: No results for input(s): POCGLU, POCNA, POCK, POCCL, POCBUN, POCHEMO, POCHCT in the last 72 hours.     Coags:   Lab Results   Component Value Date    PROTIME 17.4 06/25/2013    INR 1.6 06/25/2013    APTT 70.6 03/14/2013       HCG (If Applicable): No results found for: PREGTESTUR, PREGSERUM, HCG, HCGQUANT     ABGs: No results found for: PHART, PO2ART, DOR3TRO, OSZ6YYE, BEART, V6KJERAU     Type & Screen (If Applicable):  No results found for: LABABO, [address]    Anesthesia Evaluation  Patient summary reviewed and Nursing notes reviewed  Airway: Mallampati: II  TM distance: >3 FB   Neck ROM: full  Mouth opening: > = 3 FB Dental: normal exam         Pulmonary:normal exam  breath sounds clear to auscultation                             Cardiovascular:  Exercise tolerance: good (>4 METS),           Rhythm: regular  Rate: normal                    Neuro/Psych:               GI/Hepatic/Renal:             Endo/Other:                     Abdominal:       Abdomen: soft. Vascular:                                        Anesthesia Plan      general and MAC     ASA 3       Induction: intravenous. Anesthetic plan and risks discussed with patient. Use of blood products discussed with patient whom consented to blood products. Plan discussed with attending and CRNA.     Attending anesthesiologist reviewed and agrees with Kenyatta Duncan MD   4/5/2019

## 2019-04-05 NOTE — BRIEF OP NOTE
PODIATRY BRIEF OP NOTE    PATIENT NAME: Susanna Martinez  YOB: 1970  -  46 y.o. male  MRN: [id number]  DATE: 4/5/2019  BILLING #: [id number]    Surgeon(s):  Lita Fernandez DPM     ASSISTANTS: Jude Schreiber DPM PGY3. PRE-OP DIAGNOSIS:   1. Right hallux soft tissue mass. POST-OP DIAGNOSIS: Same as above. PROCEDURE:   1. Excision of benign lesion, right foot. ANESTHESIA: General. 13 cc of 1:1 Mix of 1% lidocaine plain and 0.5% bupivacaine plain. HEMOSTASIS: Pneumatic ankle tourniquet @ 250 mmHg for 19 minutes. ESTIMATED BLOOD LOSS: Less than 10 cc. MATERIALS: 4-0 pds, 4-0 prolene. * No implants in log *    INJECTABLES: none    SPECIMEN:   ID Type Source Tests Collected by Time Destination   A : RIGHT HALLUX SOFT TISSUE MASS Tissue Foot SURGICAL PATHOLOGY Lita Fernandez DPM 8/8/0707 3937      COMPLICATIONS: none    FINDINGS: mass measuring approximately 1 cm x 1 cm.      Jude Schreiber DPM   Podiatric Medicine & Surgery   4/5/2019 at 11:05 AM

## 2019-04-05 NOTE — H&P
History and Physical    Pt Name: Sharyle Radar  MRN: [id number]  YOB: 1970  Date of evaluation: 4/5/2019    [x] Please see the Consult Dr. Tejal Harden DPM Resident dated 4/4/19 in [facility] which meets the criteria for the admission History and Physical and is copied below. Mulu Macdonald APRN, ACNP-BC  Electronically signed 4/5/2019 at 8:51 AM    Tejal Harden DPM   Resident   Podiatry   Consults   Cosign Needed   Date of Service:  4/4/2019 12:08 PM          Related encounter: ED from 4/4/2019 in [facility] ED      Consult Orders   Inpatient consult to Podiatry [[id number]] ordered by French Fournier DO at 04/04/19 7586          Cosign Needed        Expand All Collapse All            Show:Clear all  [x]Manual[x]Template[]Copied    Added by:  [x]Jarvis Machado DPM      []Tuan for details           Consultation Note  Podiatric Medicine and Surgery      Subjective      Chief Complaint: Right hallux wound     HPI:  Sharyle Radar is a 46 y.o. male seen at [facility] ED for a bleeding right hallux wound. Patient states the lesion has been present for 2 years, which has bled intermittently. He states yesterday, it began bleeding and he went to [facility] yesterday where they stopped the bleeding with silver nitrate sticks and put a bandage on. Patient with home and states he woke up in the morning with the bandage removed in blood all over. When he went to the bathroom he expressed a syncopal episode. His wife called the EMS to [facility]. X-ray was negative for any osseous abnormalities. Hgb was stable at 14.2. PCP is Devyn Weber MD     ROS:   Review of Systems   Constitutional: Negative for chills, fatigue and fever. Respiratory: Negative for cough and shortness of breath. Gastrointestinal: Negative for diarrhea, nausea and vomiting. Musculoskeletal: Positive for gait problem and myalgias. Skin: Positive for wound.    Neurological: Positive for tremors, syncope and light-headedness. Psychiatric/Behavioral: Negative for agitation and confusion.         Past Medical History   has a past medical history of Diabetes mellitus ([facility] Hypertension, Neuropathy, and Pulmonary emboli ([facility]    Past Surgical History   has a past surgical history that includes shoulder surgery.     Medications  Home Medications           Prior to Admission medications    Medication Sig Start Date End Date Taking? Authorizing Provider   losartan (COZAAR) 50 MG tablet Take 50 mg by mouth daily     Yes Historical Provider, MD   meloxicam (MOBIC) 15 MG tablet Take 15 mg by mouth daily     Yes Historical Provider, MD   metoprolol succinate (TOPROL XL) 25 MG extended release tablet Take 25 mg by mouth daily     Yes Historical Provider, MD   simvastatin (ZOCOR) 20 MG tablet Take 20 mg by mouth nightly     Yes Historical Provider, MD   topiramate (TOPAMAX) 50 MG tablet Take 50 mg by mouth 2 times daily     Yes Historical Provider, MD   DULoxetine (CYMBALTA) 60 MG extended release capsule Take 60 mg by mouth daily     Yes Historical Provider, MD   gabapentin (NEURONTIN) 600 MG tablet Take 600 mg by mouth 4 times daily.     Yes Historical Provider, MD       Scheduled Meds:  Scheduled Medications    ondansetron  4 mg Intravenous Once    ondansetron               Continuous Infusions:  Infusions Meds        PRN Meds:.     Allergies  is allergic to codeine.     Family History  family history is not on file.     Social History   reports that he has never smoked. He has never used smokeless tobacco.   reports that he does not drink alcohol.    reports that he does not use drugs.     Objective      Vitals:  Patient Vitals for the past 8 hrs:    BP Pulse Resp SpO2 Weight   04/04/19 1142 -- 102 -- 97 % --   04/04/19 1131 (!) 144/95 -- -- 96 % --   04/04/19 1117 133/78 97 -- 95 % --   04/04/19 1035 -- 95 -- 96 % --   04/04/19 1031 97/63 114 -- -- --   04/04/19 1016 (!) 130/91 102 -- -- --   19 1001 (!) 117/98 110 -- 96 % --   19 0931 (!) 109/93 103 -- 95 % --   19 0902 108/86 103 -- 96 % --   19 0846 120/82 104 -- 95 % --   19 0831 113/80 102 -- 96 % --   19 0816 122/81 103 -- 96 % --   19 0809 124/82 100 14 97 % --   19 0806 124/82 101 16 97 % 230 lb (104.3 kg)      Average, Min, and Max for last 24 hours Vitals:  TEMPERATURE:  No data recorded     RESPIRATIONS RANGE: Resp  Avg: 15  Min: 14  Max: 16     PULSE RANGE: Pulse  Av.8  Min: 95  Max: 114     BLOOD PRESSURE RANGE:  Systolic (58CZU), FOP:122 , Min:97 , YKV:236   ; Diastolic (17OQW), RTF:88, Min:63, Max:98        PULSE OXIMETRY RANGE: SpO2  Av %  Min: 95 %  Max: 97 %  I&O:  No intake/output data recorded.     CBC:      Recent Labs     19  0832   WBC 8.5   HGB 14.2   HCT 44.0            BMP:      Recent Labs     19  0832      K 3.5*   *   CO2 21   BUN 14   CREATININE 0.93   GLUCOSE 211*   CALCIUM 9.0         Coags:  No results for input(s): APTT, PROT, INR in the last 72 hours.     No results found for: LABA1C  No results found for: SEDRATE  No results found for: CRP        Lower Extremity Physical Exam:  Vascular: DP and PT pulses are palpable. CFT <3 seconds to all digits. Hair growth is present to the level of the digits. No edema.       Neuro: Saph/sural/SP/DP/plantar sensation intact to light touch.     Musculoskeletal: Muscle strength is 4/5 to all lower extremity muscle groups. Gross deformity is absent.     Dermatologic: Oozing hemorrhagic soft tissue lesion present to the medial right hallux measuring approximately 1.5cm x 1.5cm. Base is hypergranular. No periwound erythema. Does not probe to bone, sinus track, or undermine. No fluctuance, crepitus, or induration.    Interdigital maceration absent.                    Imaging:   XR FOOT RIGHT (MIN 3 VIEWS)   Final Result   Evaluation limited due to obscuring bandage over the

## 2019-04-05 NOTE — OP NOTE
PODIATRY OPERATIVE NOTE     PATIENT NAME: Camelia Long  YOB: 1970  -  46 y.o. male  MRN: [id number]  DATE: 4/5/2019  BILLING #: [id number]     Surgeon(s): Javier Amezquita DPM      ASSISTANTS: Sathya Olsen DPM PGY3.      PRE-OP DIAGNOSIS:   1. Right hallux soft tissue mass.      POST-OP DIAGNOSIS: Same as above.     PROCEDURE:   1. Excision of benign lesion, right foot.      ANESTHESIA: General. 13 cc of 1:1 Mix of 1% lidocaine plain and 0.5% bupivacaine plain.     HEMOSTASIS: Pneumatic ankle tourniquet @ 250 mmHg for 19 minutes.     ESTIMATED BLOOD LOSS: Less than 10 cc.     MATERIALS: 4-0 pds, 4-0 prolene. * No implants in log *     INJECTABLES: none     SPECIMEN:   ID Type Source Tests Collected by Time Destination   A : RIGHT HALLUX SOFT TISSUE MASS Tissue Foot SURGICAL PATHOLOGY Javier Amezquita DPM 4/5/2019 1184        COMPLICATIONS: none     FINDINGS: mass measuring approximately 1 cm x 1 cm. INDICATION FOR PROCEDURE  This is a 46 y.o. male that presents with painful soft tissue mass for 2 years of duration. Patient was seen in the ER for bleeding of the soft tissue mass; it was cauterized in the ER. The patient has failed conservative measures and opted to have this surgically addressed. NPO status was confirmed. An H&P was performed. Consent was signed and placed in the chart. The patient was made aware of all risks, benefits and alternatives to surgery. No guarantees were given or implied. OPERATIVE PROCEDURE IN DETAIL:  The patient was brought to the operating room and placed on the operating table in the supine position. Following induction of general anesthesia, local anesthesia was obtained using 13 cc of 1:1 mix of 1% lidocaine plain and 0.5% bupivacaine plain. A tourniquet was then placed to the right ankle. The right lower extremity was then scrubbed, prepped and draped in the usual aseptic manner. Following a timeout, the tourniquet was inflated to 250 mmHg.     Attention was directed to the medial right hallux. The mass measured approximately 1 cm. A #15 blade was used to make a 3:1 ellipse. This was carried down through subcutaneous tissue with care to avoid all neurovascular structures and to cauterize all bleeders. The mass was excised in its entirety. The mass was sent to pathology in formalin. The site was irrigated with copious amounts of normal saline. A layered incision closure was then performed using 4-0 pds, 4-0 prolene. The surgical site was then dressed with adaptic, 4x4 gauze, kerlix, and an ace bandage. The tourniquet was then deflated to 0 mmHg at 19 minutes and immediate hyperemia returned to all digits of the right foot. The patient tolerated procedure and anesthesia well. Patient was transferred to the PACU with vital signs stable and neurovascular status intact. The patient was given all postoperative instructions prior to the procedure and was instructed to follow up with Dr. Dominga Benedict within 1 week of surgery.     Dax Pretty DPM  Podiatry Resident, PGY-3  [address], [place]

## 2019-04-15 LAB — SURGICAL PATHOLOGY REPORT: NORMAL

## 2019-04-17 ENCOUNTER — OFFICE VISIT (OUTPATIENT)
Dept: PODIATRY | Age: 49
End: 2019-04-17

## 2019-04-17 VITALS — HEIGHT: 71 IN | RESPIRATION RATE: 16 BRPM | WEIGHT: 225 LBS | BODY MASS INDEX: 31.5 KG/M2

## 2019-04-17 DIAGNOSIS — Z98.890 POST-OPERATIVE STATE: Primary | ICD-10-CM

## 2019-04-17 PROCEDURE — 99024 POSTOP FOLLOW-UP VISIT: CPT | Performed by: PODIATRIST

## 2019-05-08 ENCOUNTER — OFFICE VISIT (OUTPATIENT)
Dept: PODIATRY | Age: 49
End: 2019-05-08

## 2019-05-08 VITALS — BODY MASS INDEX: 32.08 KG/M2 | WEIGHT: 230 LBS | RESPIRATION RATE: 16 BRPM

## 2019-05-08 DIAGNOSIS — Z98.890 POST-OPERATIVE STATE: Primary | ICD-10-CM

## 2019-05-08 PROCEDURE — 99024 POSTOP FOLLOW-UP VISIT: CPT | Performed by: PODIATRIST

## 2019-05-08 NOTE — PROGRESS NOTES
[facility]  [facility]  [address] [address]  [address] [place] [address]  Dept: [phone]  Dept Fax: [phone]    POST-OP PROGRESS NOTE  Date of patient's visit: 5/8/2019  Patient's Name:  Jackie Devlin YOB: 1970            Patient Care Team:  Marah Cervantes MD as PCP - General  Mason Myers DPM as Physician (Podiatry)        Chief Complaint   Patient presents with    Post-Op Check     4 wk       Pt's primary care physician is Marah Cervantes MD last seen 6/25/13    Subjective: Jackie Devlin is a 46 y.o. male who presents to the office today 4week(s)  S/P removal benign lesion right hallux      [facility]  [facility] [address] [place], [place] [address]Alexei Hilton [address]  Dept: [phone]  Dept Fax: [phone]    POST-OP PROGRESS NOTE  Date of patient's visit: 5/8/2019  Patient's Name:  Jackie Devlin YOB: 1970            Patient Care Team:  Marah Cervantes MD as PCP - General  Mason Myers DPM as Physician (Podiatry)        Chief Complaint   Patient presents with    Post-Op Check     4 wk       Pt's primary care physician is Marah Cervantes MD last seen 6/25/13    Subjective: Jackie Devlin is a 46 y.o. male who presents to the office today 4week(s)  S/P removal benign lesion right hallux for correction of pain to right foot  Problem List Items Addressed This Visit     None      . Patient relates pain is Present . Pain is rated 4 out of 10 and is described as intermittent. Currently denies F/C/N/V. Is patient taking pain medications as prescribed and is controlling pain    Physical Examination:  Incision is coapted. Minimal bleeding post operatively. Edema present. No erythema. No Pus. Operative correction is satisfactory. Assessment: Jackie Devlin is status post as above  Normal post operative course. Doing well         Diagnosis Orders   1. Post-operative state             Plan:  Patient examined and evaluated. Current condition and treatment options discussed in detail. Advised pt to closely monitor for infection. As incision is well healed today, he may return to regular shoes as tolerate. Verbal and written instructions given to patient. Orders: No orders of the defined types were placed in this encounter. Contact office with any questions/problems/concerns. RTC in 2month(s).      Electronically signed by Jase Goyal DPM on 5/8/2019 at 10:07 AM

## 2020-01-01 NOTE — ANESTHESIA POSTPROCEDURE EVALUATION
Department of Anesthesiology  Postprocedure Note    Patient: Cesar Najjar  MRN: [id number]  YOB: 1970  Date of evaluation: 4/5/2019  Time:  12:59 PM     Procedure Summary     Date:  04/05/19 Room / Location:  [facility] OR 06 / [facility]    Anesthesia Start:  6453 Anesthesia Stop:  1106    Procedure:  REMOVAL BENIGN NEOPLASM RIGHT HALLUX (Right Foot) Diagnosis:  (DX   BENIGN NEOPLASM RIGHT HALLUX)    Surgeon:  María Barry DPM Responsible Provider:  Sofya Clemente MD    Anesthesia Type:  general, MAC ASA Status:  3          Anesthesia Type: general, MAC    Lyubov Phase I: Lyubov Score: 3    Lyubov Phase II:      Last vitals: Reviewed and per EMR flowsheets.        Anesthesia Post Evaluation    Patient location during evaluation: PACU  Patient participation: complete - patient participated  Level of consciousness: awake and alert  Pain score: 0  Airway patency: patent  Nausea & Vomiting: no nausea and no vomiting  Complications: no  Cardiovascular status: blood pressure returned to baseline  Respiratory status: acceptable  Hydration status: euvolemic
Statement Selected

## (undated) DEVICE — GLOVE SURG SZ 75 L12IN FNGR THK87MIL WHT LTX FREE

## (undated) DEVICE — TOURNIQUET CUF BLD PRESSURE 4X18 IN 2 PRT SINGLE BLDR RED

## (undated) DEVICE — GLOVE SURG SZ 7 L12IN FNGR THK87MIL WHT LTX FREE

## (undated) DEVICE — GOWN,AURORA,NONRNF,XL,30/CS: Brand: MEDLINE